# Patient Record
Sex: FEMALE
[De-identification: names, ages, dates, MRNs, and addresses within clinical notes are randomized per-mention and may not be internally consistent; named-entity substitution may affect disease eponyms.]

---

## 2019-06-09 ENCOUNTER — HOSPITAL ENCOUNTER (INPATIENT)
Dept: HOSPITAL 12 - REHAB | Age: 74
LOS: 10 days | Discharge: HOME HEALTH | DRG: 58 | End: 2019-06-19
Attending: PHYSICAL MEDICINE & REHABILITATION | Admitting: PHYSICAL MEDICINE & REHABILITATION
Payer: MEDICARE

## 2019-06-09 VITALS — DIASTOLIC BLOOD PRESSURE: 62 MMHG | SYSTOLIC BLOOD PRESSURE: 125 MMHG

## 2019-06-09 VITALS — SYSTOLIC BLOOD PRESSURE: 132 MMHG | DIASTOLIC BLOOD PRESSURE: 67 MMHG

## 2019-06-09 DIAGNOSIS — Z91.81: ICD-10-CM

## 2019-06-09 DIAGNOSIS — J44.9: ICD-10-CM

## 2019-06-09 DIAGNOSIS — Z66: ICD-10-CM

## 2019-06-09 DIAGNOSIS — M81.0: ICD-10-CM

## 2019-06-09 DIAGNOSIS — E03.9: ICD-10-CM

## 2019-06-09 DIAGNOSIS — I50.32: ICD-10-CM

## 2019-06-09 DIAGNOSIS — D63.8: ICD-10-CM

## 2019-06-09 DIAGNOSIS — G62.9: ICD-10-CM

## 2019-06-09 DIAGNOSIS — H54.62: ICD-10-CM

## 2019-06-09 DIAGNOSIS — I70.0: ICD-10-CM

## 2019-06-09 DIAGNOSIS — R26.9: ICD-10-CM

## 2019-06-09 DIAGNOSIS — Z99.3: ICD-10-CM

## 2019-06-09 DIAGNOSIS — Z96.642: ICD-10-CM

## 2019-06-09 DIAGNOSIS — H46.12: ICD-10-CM

## 2019-06-09 DIAGNOSIS — Z87.891: ICD-10-CM

## 2019-06-09 DIAGNOSIS — R53.81: ICD-10-CM

## 2019-06-09 DIAGNOSIS — G89.29: ICD-10-CM

## 2019-06-09 DIAGNOSIS — G35: Primary | ICD-10-CM

## 2019-06-09 DIAGNOSIS — E78.5: ICD-10-CM

## 2019-06-09 DIAGNOSIS — I42.9: ICD-10-CM

## 2019-06-09 DIAGNOSIS — F33.2: ICD-10-CM

## 2019-06-09 DIAGNOSIS — M19.90: ICD-10-CM

## 2019-06-09 DIAGNOSIS — G93.41: ICD-10-CM

## 2019-06-09 DIAGNOSIS — I11.0: ICD-10-CM

## 2019-06-09 DIAGNOSIS — I50.9: ICD-10-CM

## 2019-06-09 RX ADMIN — TEMAZEPAM PRN MG: 15 CAPSULE ORAL at 23:21

## 2019-06-09 RX ADMIN — MORPHINE SULFATE SCH MG: 15 TABLET ORAL at 21:04

## 2019-06-09 RX ADMIN — Medication PRN MG: at 22:35

## 2019-06-09 NOTE — NUR
Received pt in bed, AAO x 3, watching television. No acute distress noted. Verbally 
responsive and able to make needs known. Denies pain or discomfort at this time. All due 
medications given as ordered, tolerated. All safety measures and fall precautions 
maintained. Call light and all personal belongings within reach. Will continue to monitor.

## 2019-06-09 NOTE — NUR
125/62, 98% on room air, 72 heart rate, 98.4 oral temperature, 19 respirations, no 
complaints of pain, no signs distress, MD morales notified of patients arrival, patient 
noted transferring to commode

## 2019-06-09 NOTE — NUR
RECEIVED ON UNIT WITH FAMILY AND FRIENDS AT HER SIDE. BROUGHT IN VIA HER OWN W/C FROM HOME. 
IN NO ACUTE DISTRESS. ORIENTED TO ROOM AND SURROUNDING VERBALIZE UNDERSTANDING.

## 2019-06-10 VITALS — DIASTOLIC BLOOD PRESSURE: 80 MMHG | SYSTOLIC BLOOD PRESSURE: 135 MMHG

## 2019-06-10 VITALS — SYSTOLIC BLOOD PRESSURE: 115 MMHG | DIASTOLIC BLOOD PRESSURE: 60 MMHG

## 2019-06-10 VITALS — DIASTOLIC BLOOD PRESSURE: 83 MMHG | SYSTOLIC BLOOD PRESSURE: 155 MMHG

## 2019-06-10 RX ADMIN — GABAPENTIN SCH MG: 400 CAPSULE ORAL at 08:26

## 2019-06-10 RX ADMIN — ATORVASTATIN CALCIUM SCH MG: 40 TABLET, FILM COATED ORAL at 20:25

## 2019-06-10 RX ADMIN — Medication SCH MG: at 08:22

## 2019-06-10 RX ADMIN — PANTOPRAZOLE SODIUM SCH MG: 40 TABLET, DELAYED RELEASE ORAL at 06:37

## 2019-06-10 RX ADMIN — Medication SCH MG: at 13:26

## 2019-06-10 RX ADMIN — FUROSEMIDE SCH MG: 40 TABLET ORAL at 17:24

## 2019-06-10 RX ADMIN — GABAPENTIN SCH MG: 400 CAPSULE ORAL at 17:23

## 2019-06-10 RX ADMIN — LEVOTHYROXINE SODIUM SCH MCG: 75 TABLET ORAL at 06:37

## 2019-06-10 RX ADMIN — FUROSEMIDE SCH MG: 40 TABLET ORAL at 08:21

## 2019-06-10 RX ADMIN — Medication PRN MG: at 15:30

## 2019-06-10 RX ADMIN — ALENDRONATE SODIUM SCH MG: 70 TABLET ORAL at 08:26

## 2019-06-10 RX ADMIN — PREDNISOLONE ACETATE SCH DROP: 10 SUSPENSION/ DROPS OPHTHALMIC at 17:26

## 2019-06-10 RX ADMIN — METOPROLOL SUCCINATE SCH MG: 50 TABLET, FILM COATED, EXTENDED RELEASE ORAL at 08:22

## 2019-06-10 RX ADMIN — Medication SCH MG: at 17:23

## 2019-06-10 RX ADMIN — MORPHINE SULFATE SCH MG: 15 TABLET ORAL at 20:26

## 2019-06-10 RX ADMIN — ATORVASTATIN CALCIUM SCH MG: 40 TABLET, FILM COATED ORAL at 20:29

## 2019-06-10 RX ADMIN — Medication SCH MEQ: at 08:21

## 2019-06-10 RX ADMIN — Medication PRN MG: at 08:21

## 2019-06-10 RX ADMIN — Medication PRN MG: at 22:51

## 2019-06-10 RX ADMIN — VITAMIN D, TAB 1000IU (100/BT) SCH UNIT: 25 TAB at 08:20

## 2019-06-10 RX ADMIN — GABAPENTIN SCH MG: 400 CAPSULE ORAL at 13:26

## 2019-06-10 RX ADMIN — MORPHINE SULFATE SCH MG: 15 TABLET ORAL at 08:21

## 2019-06-10 NOTE — NUR
Patient alert and oriented x 3-4. Received in bed watching TV. C/O pain upon assessment, but 
does not watch medication at this time. Psych consult ordered, but not seen yet. Patient 
assisted to wheelchair so she can explore the unit. No SOB or distress notes. Will continue 
to monitor.

## 2019-06-10 NOTE — NUR
Patient made statements to   about "wishing she would die soon", denies suicide 
plan. MD Lemons notified with orders for Psyche evaluation. Order placed at this time

## 2019-06-10 NOTE — NUR
Patient noted eating breakfast at this time, took all medications this morning, prn pain 
medication given for left sided generalized pain, no signs of distress noted, call light 
placed in reach, all needs met at this time

## 2019-06-11 VITALS — DIASTOLIC BLOOD PRESSURE: 86 MMHG | SYSTOLIC BLOOD PRESSURE: 172 MMHG

## 2019-06-11 VITALS — SYSTOLIC BLOOD PRESSURE: 166 MMHG | DIASTOLIC BLOOD PRESSURE: 75 MMHG

## 2019-06-11 VITALS — DIASTOLIC BLOOD PRESSURE: 79 MMHG | SYSTOLIC BLOOD PRESSURE: 157 MMHG

## 2019-06-11 VITALS — DIASTOLIC BLOOD PRESSURE: 75 MMHG | SYSTOLIC BLOOD PRESSURE: 146 MMHG

## 2019-06-11 RX ADMIN — GABAPENTIN SCH MG: 400 CAPSULE ORAL at 13:24

## 2019-06-11 RX ADMIN — GABAPENTIN SCH MG: 400 CAPSULE ORAL at 08:35

## 2019-06-11 RX ADMIN — FUROSEMIDE SCH MG: 40 TABLET ORAL at 08:36

## 2019-06-11 RX ADMIN — PANTOPRAZOLE SODIUM SCH MG: 40 TABLET, DELAYED RELEASE ORAL at 06:33

## 2019-06-11 RX ADMIN — Medication SCH MG: at 08:34

## 2019-06-11 RX ADMIN — Medication SCH MG: at 16:56

## 2019-06-11 RX ADMIN — Medication PRN MG: at 15:34

## 2019-06-11 RX ADMIN — MORPHINE SULFATE SCH MG: 15 TABLET ORAL at 20:52

## 2019-06-11 RX ADMIN — Medication SCH MG: at 08:35

## 2019-06-11 RX ADMIN — VITAMIN D, TAB 1000IU (100/BT) SCH UNIT: 25 TAB at 08:35

## 2019-06-11 RX ADMIN — GABAPENTIN SCH MG: 400 CAPSULE ORAL at 16:56

## 2019-06-11 RX ADMIN — PREDNISOLONE ACETATE SCH DROP: 10 SUSPENSION/ DROPS OPHTHALMIC at 08:36

## 2019-06-11 RX ADMIN — PREDNISOLONE ACETATE SCH DROP: 10 SUSPENSION/ DROPS OPHTHALMIC at 16:56

## 2019-06-11 RX ADMIN — Medication SCH MEQ: at 08:36

## 2019-06-11 RX ADMIN — FUROSEMIDE SCH MG: 40 TABLET ORAL at 16:56

## 2019-06-11 RX ADMIN — LEVOTHYROXINE SODIUM SCH MCG: 75 TABLET ORAL at 06:33

## 2019-06-11 RX ADMIN — TEMAZEPAM PRN MG: 15 CAPSULE ORAL at 01:25

## 2019-06-11 RX ADMIN — METOPROLOL SUCCINATE SCH MG: 50 TABLET, FILM COATED, EXTENDED RELEASE ORAL at 08:36

## 2019-06-11 RX ADMIN — MORPHINE SULFATE SCH MG: 15 TABLET ORAL at 08:35

## 2019-06-11 NOTE — NUR
Received patient awake in bed in stable condition. For psychiatrist consult regarding 
verbalization, she wants to die the sooner the better. MD Olivera notified thru voice 
message. will continue monitor

## 2019-06-11 NOTE — NUR
Patient slept poor this night shift. PRN Restoril given, without much effective. PRN pain 
medication given x1. All due medication given-tolerated well. Hydralazine PRN given-will 
monitor for BP decrease. given snacks as requested during the night. Patient would 
frequently wheel herself up to the nurses station and talk with nurses for distraction from 
inability to sleep. Side rails up bilaterally for safety. Call light and frequently used 
items within reach. Will endorse to oncoming shift accordingly.

## 2019-06-11 NOTE — NUR
Morning BP noted to be 172/86. On call MD Tello contacted for orders. MD order 
Hydralazine 25mg once, with day shift MD to follow up to ajsust medications. Will continue 
to monitor.

## 2019-06-12 VITALS — SYSTOLIC BLOOD PRESSURE: 99 MMHG | DIASTOLIC BLOOD PRESSURE: 54 MMHG

## 2019-06-12 VITALS — DIASTOLIC BLOOD PRESSURE: 68 MMHG | SYSTOLIC BLOOD PRESSURE: 144 MMHG

## 2019-06-12 VITALS — DIASTOLIC BLOOD PRESSURE: 80 MMHG | SYSTOLIC BLOOD PRESSURE: 159 MMHG

## 2019-06-12 VITALS — SYSTOLIC BLOOD PRESSURE: 148 MMHG | DIASTOLIC BLOOD PRESSURE: 72 MMHG

## 2019-06-12 LAB
ALP SERPL-CCNC: 99 U/L (ref 50–136)
ALT SERPL W/O P-5'-P-CCNC: 19 U/L (ref 14–59)
AST SERPL-CCNC: 27 U/L (ref 15–37)
BASOPHILS # BLD AUTO: 0 K/UL (ref 0–8)
BASOPHILS NFR BLD AUTO: 0.5 % (ref 0–2)
BILIRUB SERPL-MCNC: 0.2 MG/DL (ref 0.2–1)
BUN SERPL-MCNC: 19 MG/DL (ref 7–18)
CHLORIDE SERPL-SCNC: 104 MMOL/L (ref 98–107)
CHOLEST SERPL-MCNC: 141 MG/DL (ref ?–200)
CO2 SERPL-SCNC: 29 MMOL/L (ref 21–32)
CREAT SERPL-MCNC: 0.9 MG/DL (ref 0.6–1.3)
EOSINOPHIL # BLD AUTO: 0.1 K/UL (ref 0–0.7)
EOSINOPHIL NFR BLD AUTO: 2 % (ref 0–7)
GLUCOSE SERPL-MCNC: 89 MG/DL (ref 74–106)
HCT VFR BLD AUTO: 31.7 % (ref 31.2–41.9)
HDLC SERPL-MCNC: 38 MG/DL (ref 40–60)
HGB BLD-MCNC: 10.3 G/DL (ref 10.9–14.3)
IRON SERPL-MCNC: 25 UG/DL (ref 50–175)
LYMPHOCYTES # BLD AUTO: 0.4 K/UL (ref 20–40)
LYMPHOCYTES NFR BLD AUTO: 9 % (ref 20.5–51.5)
MAGNESIUM SERPL-MCNC: 1.9 MG/DL (ref 1.8–2.4)
MCH RBC QN AUTO: 27.4 UUG (ref 24.7–32.8)
MCHC RBC AUTO-ENTMCNC: 32 G/DL (ref 32.3–35.6)
MCV RBC AUTO: 84.4 FL (ref 75.5–95.3)
MONOCYTES # BLD AUTO: 0.5 K/UL (ref 2–10)
MONOCYTES NFR BLD AUTO: 11 % (ref 0–11)
NEUTROPHILS # BLD AUTO: 3.8 K/UL (ref 1.8–8.9)
NEUTROPHILS NFR BLD AUTO: 77.5 % (ref 38.5–71.5)
PHOSPHATE SERPL-MCNC: 3.4 MG/DL (ref 2.5–4.9)
PLATELET # BLD AUTO: 382 K/UL (ref 179–408)
POTASSIUM SERPL-SCNC: 4 MMOL/L (ref 3.5–5.1)
RBC # BLD AUTO: 3.75 MIL/UL (ref 3.63–4.92)
TRIGL SERPL-MCNC: 205 MG/DL (ref 30–150)
TSH SERPL DL<=0.005 MIU/L-ACNC: 2.26 MIU/ML (ref 0.36–3.74)
WBC # BLD AUTO: 4.9 K/UL (ref 3.8–11.8)
WS STN SPEC: 6.8 G/DL (ref 6.4–8.2)

## 2019-06-12 RX ADMIN — Medication SCH MEQ: at 09:49

## 2019-06-12 RX ADMIN — FUROSEMIDE SCH MG: 40 TABLET ORAL at 09:49

## 2019-06-12 RX ADMIN — LEVOTHYROXINE SODIUM SCH MCG: 75 TABLET ORAL at 06:31

## 2019-06-12 RX ADMIN — FERROUS SULFATE TAB 325 MG (65 MG ELEMENTAL FE) SCH MG: 325 (65 FE) TAB at 10:02

## 2019-06-12 RX ADMIN — GABAPENTIN SCH MG: 400 CAPSULE ORAL at 13:18

## 2019-06-12 RX ADMIN — PANTOPRAZOLE SODIUM SCH MG: 40 TABLET, DELAYED RELEASE ORAL at 06:29

## 2019-06-12 RX ADMIN — FUROSEMIDE SCH MG: 40 TABLET ORAL at 17:00

## 2019-06-12 RX ADMIN — PREDNISOLONE ACETATE SCH DROP: 10 SUSPENSION/ DROPS OPHTHALMIC at 09:54

## 2019-06-12 RX ADMIN — METOPROLOL SUCCINATE SCH MG: 50 TABLET, FILM COATED, EXTENDED RELEASE ORAL at 09:53

## 2019-06-12 RX ADMIN — GABAPENTIN SCH MG: 400 CAPSULE ORAL at 17:15

## 2019-06-12 RX ADMIN — Medication SCH MG: at 09:51

## 2019-06-12 RX ADMIN — Medication SCH MG: at 09:50

## 2019-06-12 RX ADMIN — ATORVASTATIN CALCIUM SCH MG: 40 TABLET, FILM COATED ORAL at 20:22

## 2019-06-12 RX ADMIN — VITAMIN D, TAB 1000IU (100/BT) SCH UNIT: 25 TAB at 09:50

## 2019-06-12 RX ADMIN — MORPHINE SULFATE SCH MG: 15 TABLET ORAL at 20:22

## 2019-06-12 RX ADMIN — PREDNISOLONE ACETATE SCH DROP: 10 SUSPENSION/ DROPS OPHTHALMIC at 17:14

## 2019-06-12 RX ADMIN — Medication PRN MG: at 00:37

## 2019-06-12 RX ADMIN — GABAPENTIN SCH MG: 400 CAPSULE ORAL at 09:50

## 2019-06-12 RX ADMIN — MORPHINE SULFATE SCH MG: 15 TABLET ORAL at 09:53

## 2019-06-12 RX ADMIN — AMLODIPINE BESYLATE SCH MG: 5 TABLET ORAL at 10:02

## 2019-06-12 RX ADMIN — Medication SCH MG: at 17:14

## 2019-06-12 RX ADMIN — TEMAZEPAM PRN MG: 15 CAPSULE ORAL at 01:16

## 2019-06-12 NOTE — NUR
Patient awake, alert, in bed, not in any form of distress. She denies any pain or discomfort 
at this time. Assisted her to the toilet via wheelchair. Call light and frequently used 
items placed within reach.

## 2019-06-12 NOTE — NUR
aaox4 OOB in wheelchair most of the shift needs attended. complained of pain 

in the neck radiating to lower back. MS contin(scheduled) given. Tolerated well.

Voiding well. Requested for sleeping pill given as needed plus the Oxy 15 mg given

for pain. VSS. Will monitor patient. Patient due for psych consult, awaiting for

the psych doctor to see her.

## 2019-06-12 NOTE — NUR
Patient received sitting in the wheelchair, AAO x4. Able to make needs known. No acute 
distress or SOB noted. On room air. Complained of severe pain on her neck, rated 9/10. 
Physical assessment done. Safety measures observed. Fall precaution maintained. Bed in low 
position, side rails up x2 for safety, brake and alarm on. call light and personal 
belongings within reach. Continue to monitor.

## 2019-06-13 VITALS — DIASTOLIC BLOOD PRESSURE: 85 MMHG | SYSTOLIC BLOOD PRESSURE: 165 MMHG

## 2019-06-13 VITALS — SYSTOLIC BLOOD PRESSURE: 117 MMHG | DIASTOLIC BLOOD PRESSURE: 60 MMHG

## 2019-06-13 VITALS — SYSTOLIC BLOOD PRESSURE: 147 MMHG | DIASTOLIC BLOOD PRESSURE: 79 MMHG

## 2019-06-13 VITALS — SYSTOLIC BLOOD PRESSURE: 129 MMHG | DIASTOLIC BLOOD PRESSURE: 78 MMHG

## 2019-06-13 RX ADMIN — AMLODIPINE BESYLATE SCH MG: 5 TABLET ORAL at 08:36

## 2019-06-13 RX ADMIN — ATORVASTATIN CALCIUM SCH MG: 40 TABLET, FILM COATED ORAL at 20:02

## 2019-06-13 RX ADMIN — LEVOTHYROXINE SODIUM SCH MCG: 75 TABLET ORAL at 06:42

## 2019-06-13 RX ADMIN — Medication SCH MEQ: at 08:36

## 2019-06-13 RX ADMIN — FUROSEMIDE SCH MG: 40 TABLET ORAL at 17:00

## 2019-06-13 RX ADMIN — MORPHINE SULFATE SCH MG: 15 TABLET ORAL at 20:02

## 2019-06-13 RX ADMIN — PREDNISOLONE ACETATE SCH DROP: 10 SUSPENSION/ DROPS OPHTHALMIC at 17:25

## 2019-06-13 RX ADMIN — TEMAZEPAM PRN MG: 15 CAPSULE ORAL at 21:58

## 2019-06-13 RX ADMIN — TEMAZEPAM PRN MG: 15 CAPSULE ORAL at 01:40

## 2019-06-13 RX ADMIN — MORPHINE SULFATE SCH MG: 15 TABLET ORAL at 08:36

## 2019-06-13 RX ADMIN — Medication SCH MG: at 17:24

## 2019-06-13 RX ADMIN — PREDNISOLONE ACETATE SCH DROP: 10 SUSPENSION/ DROPS OPHTHALMIC at 08:37

## 2019-06-13 RX ADMIN — FUROSEMIDE SCH MG: 40 TABLET ORAL at 08:36

## 2019-06-13 RX ADMIN — PANTOPRAZOLE SODIUM SCH MG: 40 TABLET, DELAYED RELEASE ORAL at 06:42

## 2019-06-13 RX ADMIN — Medication PRN MG: at 10:57

## 2019-06-13 RX ADMIN — Medication SCH MG: at 08:34

## 2019-06-13 RX ADMIN — Medication SCH EA: at 17:25

## 2019-06-13 RX ADMIN — VITAMIN D, TAB 1000IU (100/BT) SCH UNIT: 25 TAB at 08:36

## 2019-06-13 RX ADMIN — Medication PRN MG: at 00:12

## 2019-06-13 RX ADMIN — GABAPENTIN SCH MG: 400 CAPSULE ORAL at 08:34

## 2019-06-13 RX ADMIN — GABAPENTIN SCH MG: 400 CAPSULE ORAL at 12:57

## 2019-06-13 RX ADMIN — FERROUS SULFATE TAB 325 MG (65 MG ELEMENTAL FE) SCH MG: 325 (65 FE) TAB at 08:36

## 2019-06-13 RX ADMIN — METOPROLOL SUCCINATE SCH MG: 50 TABLET, FILM COATED, EXTENDED RELEASE ORAL at 08:36

## 2019-06-13 RX ADMIN — Medication SCH MG: at 08:37

## 2019-06-13 RX ADMIN — GABAPENTIN SCH MG: 400 CAPSULE ORAL at 17:24

## 2019-06-13 NOTE — NUR
Received patient awake in bed in stable condition. Continue therapy for ambulation, ADL, and 
unsteady gait. no behavioral issue noted. Continue pain management if needed. will continue 
monitor

## 2019-06-13 NOTE — NUR
Patient received in bed, AAO x4. Able to make needs known. No acute distress or SOB noted. 
On room air. Complained of severe pain on her neck, rated 8/10. Physical assessment done. 
Safety measures observed. Fall precaution maintained. Bed in low position, side rails up x2 
for safety, brake and alarm on. call light and personal belongings within reach. Continue to 
monitor.

## 2019-06-13 NOTE — NUR
As per therapy, Patient verbalize that she dont want to live in her brothers place and 
nursing home. PT said, will see the patient tomorrow, Patient verbalize"If Im still here". 
MD Koroma (Psych on call) notified and agree to consult tomorrow. Patient consumed % 
of her meal.  will continue monitor

## 2019-06-13 NOTE — NUR
Patient developed a high BP:165/85, HR:81. No PRN medication for BP. Contacted on-call 
doctor for the order, Waiting for the response. Will monitor the patient and will endorse to 
the day shift nurse accordingly.

## 2019-06-14 VITALS — SYSTOLIC BLOOD PRESSURE: 146 MMHG | DIASTOLIC BLOOD PRESSURE: 62 MMHG

## 2019-06-14 VITALS — DIASTOLIC BLOOD PRESSURE: 75 MMHG | SYSTOLIC BLOOD PRESSURE: 119 MMHG

## 2019-06-14 VITALS — DIASTOLIC BLOOD PRESSURE: 74 MMHG | SYSTOLIC BLOOD PRESSURE: 143 MMHG

## 2019-06-14 LAB — HEMOCCULT STL QL: POSITIVE

## 2019-06-14 RX ADMIN — METOPROLOL SUCCINATE SCH MG: 50 TABLET, FILM COATED, EXTENDED RELEASE ORAL at 08:38

## 2019-06-14 RX ADMIN — Medication SCH EA: at 17:18

## 2019-06-14 RX ADMIN — PREDNISOLONE ACETATE SCH DROP: 10 SUSPENSION/ DROPS OPHTHALMIC at 08:39

## 2019-06-14 RX ADMIN — VITAMIN D, TAB 1000IU (100/BT) SCH UNIT: 25 TAB at 08:37

## 2019-06-14 RX ADMIN — PREDNISOLONE ACETATE SCH DROP: 10 SUSPENSION/ DROPS OPHTHALMIC at 17:17

## 2019-06-14 RX ADMIN — Medication SCH MG: at 08:40

## 2019-06-14 RX ADMIN — Medication SCH MG: at 17:18

## 2019-06-14 RX ADMIN — MORPHINE SULFATE SCH MG: 15 TABLET ORAL at 08:48

## 2019-06-14 RX ADMIN — GABAPENTIN SCH MG: 400 CAPSULE ORAL at 08:38

## 2019-06-14 RX ADMIN — FUROSEMIDE SCH MG: 40 TABLET ORAL at 17:00

## 2019-06-14 RX ADMIN — Medication SCH EA: at 08:39

## 2019-06-14 RX ADMIN — FERROUS SULFATE TAB 325 MG (65 MG ELEMENTAL FE) SCH MG: 325 (65 FE) TAB at 08:38

## 2019-06-14 RX ADMIN — AMLODIPINE BESYLATE SCH MG: 5 TABLET ORAL at 08:37

## 2019-06-14 RX ADMIN — Medication SCH MG: at 08:39

## 2019-06-14 RX ADMIN — Medication SCH MEQ: at 08:37

## 2019-06-14 RX ADMIN — GABAPENTIN SCH MG: 400 CAPSULE ORAL at 17:18

## 2019-06-14 RX ADMIN — Medication PRN MG: at 01:16

## 2019-06-14 RX ADMIN — PANTOPRAZOLE SODIUM SCH MG: 40 TABLET, DELAYED RELEASE ORAL at 06:31

## 2019-06-14 RX ADMIN — GABAPENTIN SCH MG: 400 CAPSULE ORAL at 13:24

## 2019-06-14 RX ADMIN — Medication PRN MG: at 21:49

## 2019-06-14 RX ADMIN — Medication PRN MG: at 11:11

## 2019-06-14 RX ADMIN — FUROSEMIDE SCH MG: 40 TABLET ORAL at 08:37

## 2019-06-14 RX ADMIN — LEVOTHYROXINE SODIUM SCH MCG: 75 TABLET ORAL at 06:30

## 2019-06-14 RX ADMIN — ATORVASTATIN CALCIUM SCH MG: 40 TABLET, FILM COATED ORAL at 20:59

## 2019-06-14 NOTE — NUR
Spoke with MD on call Gabe regarding positive occult stool results. MD to put in orders 
for CBC for tomorrow morning. Will continue to monitor.

## 2019-06-14 NOTE — NUR
Patient received in wheelchair watching TV. Alert and oriented x 3-4. No C/O pain upon 
assessment. Stool collected as ordered. No SOB or distress notes. Call light and frequently 
used items within reach. Will continue to monitor.

## 2019-06-14 NOTE — NUR
Received patient awake, alert, sitting on the wheelchair, not in any form of distress. She 
denies any pain or discomfort at this time. Needs attended to. Call light and frequently 
used items within reach.

## 2019-06-14 NOTE — NUR
End of the shift note

Patient was stable throughout the shift and has a good sleep last night. No acute distress 
or SOB noted. On room air. Complained of pain in her neck rated 8/10 in numeric scale. Pain 
assessed and reassessed after pain medication. Checked Vital Signs. Offloaded heels by 
pillow. Assisted her to the bathroom by stand by supervision as needed. All due medications 
given and tolerated well. Safety measures observed. Fall precaution maintained. Bed in low 
position, side rails up x2 for safety, brake and alarm on. call light and personal 
belongings within reach. Continue to monitor.

## 2019-06-15 VITALS — DIASTOLIC BLOOD PRESSURE: 89 MMHG | SYSTOLIC BLOOD PRESSURE: 155 MMHG

## 2019-06-15 VITALS — SYSTOLIC BLOOD PRESSURE: 113 MMHG | DIASTOLIC BLOOD PRESSURE: 55 MMHG

## 2019-06-15 LAB
BASOPHILS # BLD AUTO: 0 K/UL (ref 0–8)
BASOPHILS NFR BLD AUTO: 0.5 % (ref 0–2)
BASOPHILS NFR BLD MANUAL: 0 % (ref 0–2)
EOSINOPHIL # BLD AUTO: 0.2 K/UL (ref 0–0.7)
EOSINOPHIL NFR BLD AUTO: 4.1 % (ref 0–7)
EOSINOPHIL NFR BLD MANUAL: 0 % (ref 0–8)
HCT VFR BLD AUTO: 30.3 % (ref 31.2–41.9)
HGB BLD-MCNC: 9.9 G/DL (ref 10.9–14.3)
LYMPHOCYTES # BLD AUTO: 0.7 K/UL (ref 20–40)
LYMPHOCYTES NFR BLD AUTO: 15.6 % (ref 20.5–51.5)
LYMPHOCYTES NFR BLD MANUAL: 17 % (ref 20–40)
MCH RBC QN AUTO: 27.6 UUG (ref 24.7–32.8)
MCHC RBC AUTO-ENTMCNC: 33 G/DL (ref 32.3–35.6)
MCV RBC AUTO: 84 FL (ref 75.5–95.3)
MONOCYTES # BLD AUTO: 0.7 K/UL (ref 2–10)
MONOCYTES NFR BLD AUTO: 14.5 % (ref 0–11)
MONOCYTES NFR BLD MANUAL: 5 % (ref 2–10)
NEUTROPHILS # BLD AUTO: 2.9 K/UL (ref 1.8–8.9)
NEUTROPHILS NFR BLD AUTO: 65.3 % (ref 38.5–71.5)
NEUTS SEG NFR BLD MANUAL: 68 % (ref 42–75)
PLATELET # BLD AUTO: 321 K/UL (ref 179–408)
RBC # BLD AUTO: 3.61 MIL/UL (ref 3.63–4.92)
WBC # BLD AUTO: 4.5 K/UL (ref 3.8–11.8)

## 2019-06-15 RX ADMIN — BUPROPION HYDROCHLORIDE SCH MG: 100 TABLET, EXTENDED RELEASE ORAL at 08:46

## 2019-06-15 RX ADMIN — GABAPENTIN SCH MG: 400 CAPSULE ORAL at 18:40

## 2019-06-15 RX ADMIN — Medication SCH MG: at 18:42

## 2019-06-15 RX ADMIN — Medication PRN MG: at 20:00

## 2019-06-15 RX ADMIN — Medication SCH MG: at 08:47

## 2019-06-15 RX ADMIN — TEMAZEPAM PRN MG: 15 CAPSULE ORAL at 01:17

## 2019-06-15 RX ADMIN — Medication SCH MEQ: at 08:46

## 2019-06-15 RX ADMIN — Medication SCH EA: at 18:41

## 2019-06-15 RX ADMIN — GABAPENTIN SCH MG: 400 CAPSULE ORAL at 08:47

## 2019-06-15 RX ADMIN — FUROSEMIDE SCH MG: 40 TABLET ORAL at 08:50

## 2019-06-15 RX ADMIN — FUROSEMIDE SCH MG: 40 TABLET ORAL at 13:58

## 2019-06-15 RX ADMIN — PANTOPRAZOLE SODIUM SCH MG: 40 TABLET, DELAYED RELEASE ORAL at 06:34

## 2019-06-15 RX ADMIN — PREDNISOLONE ACETATE SCH DROP: 10 SUSPENSION/ DROPS OPHTHALMIC at 18:43

## 2019-06-15 RX ADMIN — GABAPENTIN SCH MG: 400 CAPSULE ORAL at 13:09

## 2019-06-15 RX ADMIN — PREDNISOLONE ACETATE SCH DROP: 10 SUSPENSION/ DROPS OPHTHALMIC at 08:48

## 2019-06-15 RX ADMIN — ATORVASTATIN CALCIUM SCH MG: 40 TABLET, FILM COATED ORAL at 20:00

## 2019-06-15 RX ADMIN — Medication SCH EA: at 08:48

## 2019-06-15 RX ADMIN — FERROUS SULFATE TAB 325 MG (65 MG ELEMENTAL FE) SCH MG: 325 (65 FE) TAB at 08:45

## 2019-06-15 RX ADMIN — VITAMIN D, TAB 1000IU (100/BT) SCH UNIT: 25 TAB at 08:44

## 2019-06-15 RX ADMIN — LEVOTHYROXINE SODIUM SCH MCG: 75 TABLET ORAL at 06:34

## 2019-06-15 RX ADMIN — METOPROLOL SUCCINATE SCH MG: 50 TABLET, FILM COATED, EXTENDED RELEASE ORAL at 08:45

## 2019-06-15 RX ADMIN — Medication PRN MG: at 08:44

## 2019-06-15 RX ADMIN — AMLODIPINE BESYLATE SCH MG: 5 TABLET ORAL at 08:45

## 2019-06-15 NOTE — NUR
1700 lasix given early per pt request. she says she wants it earlier so she won't be up 
urinating too late.

## 2019-06-15 NOTE — NUR
Patient received in bed, AAO x4. Able to make needs known. No acute distress or SOB noted. 
On room air. Complained of severe pain on her neck, rated 9/10. Physical assessment done. 
Safety measures observed. Fall precaution maintained. Bed in low position, side rails up x2 
for safety, brake and alarm on. call light and personal belongings within reach. Continue to 
monitor.

## 2019-06-16 VITALS — DIASTOLIC BLOOD PRESSURE: 77 MMHG | SYSTOLIC BLOOD PRESSURE: 154 MMHG

## 2019-06-16 VITALS — SYSTOLIC BLOOD PRESSURE: 98 MMHG | DIASTOLIC BLOOD PRESSURE: 60 MMHG

## 2019-06-16 RX ADMIN — AMLODIPINE BESYLATE SCH MG: 5 TABLET ORAL at 08:41

## 2019-06-16 RX ADMIN — VITAMIN D, TAB 1000IU (100/BT) SCH UNIT: 25 TAB at 08:41

## 2019-06-16 RX ADMIN — BUPROPION HYDROCHLORIDE SCH MG: 100 TABLET, EXTENDED RELEASE ORAL at 08:44

## 2019-06-16 RX ADMIN — Medication SCH MEQ: at 08:42

## 2019-06-16 RX ADMIN — GABAPENTIN SCH MG: 400 CAPSULE ORAL at 08:43

## 2019-06-16 RX ADMIN — FUROSEMIDE SCH MG: 40 TABLET ORAL at 08:42

## 2019-06-16 RX ADMIN — TEMAZEPAM PRN MG: 15 CAPSULE ORAL at 00:28

## 2019-06-16 RX ADMIN — Medication SCH EA: at 17:26

## 2019-06-16 RX ADMIN — PREDNISOLONE ACETATE SCH DROP: 10 SUSPENSION/ DROPS OPHTHALMIC at 08:43

## 2019-06-16 RX ADMIN — Medication PRN MG: at 14:19

## 2019-06-16 RX ADMIN — NICOTINE SCH MG: 21 PATCH, EXTENDED RELEASE TOPICAL at 08:42

## 2019-06-16 RX ADMIN — Medication SCH MG: at 08:43

## 2019-06-16 RX ADMIN — PREDNISOLONE ACETATE SCH DROP: 10 SUSPENSION/ DROPS OPHTHALMIC at 17:25

## 2019-06-16 RX ADMIN — Medication SCH MG: at 17:25

## 2019-06-16 RX ADMIN — PANTOPRAZOLE SODIUM SCH MG: 40 TABLET, DELAYED RELEASE ORAL at 06:43

## 2019-06-16 RX ADMIN — Medication PRN MG: at 06:52

## 2019-06-16 RX ADMIN — LEVOTHYROXINE SODIUM SCH MCG: 75 TABLET ORAL at 06:43

## 2019-06-16 RX ADMIN — MORPHINE SULFATE PRN MG: 15 TABLET ORAL at 01:18

## 2019-06-16 RX ADMIN — GABAPENTIN SCH MG: 400 CAPSULE ORAL at 14:09

## 2019-06-16 RX ADMIN — Medication PRN MG: at 22:17

## 2019-06-16 RX ADMIN — Medication SCH EA: at 08:43

## 2019-06-16 RX ADMIN — ATORVASTATIN CALCIUM SCH MG: 40 TABLET, FILM COATED ORAL at 20:20

## 2019-06-16 RX ADMIN — GABAPENTIN SCH MG: 400 CAPSULE ORAL at 17:25

## 2019-06-16 RX ADMIN — FUROSEMIDE SCH MG: 40 TABLET ORAL at 14:10

## 2019-06-16 RX ADMIN — FERROUS SULFATE TAB 325 MG (65 MG ELEMENTAL FE) SCH MG: 325 (65 FE) TAB at 08:42

## 2019-06-16 RX ADMIN — METOPROLOL SUCCINATE SCH MG: 50 TABLET, FILM COATED, EXTENDED RELEASE ORAL at 08:42

## 2019-06-16 NOTE — NUR
Patient received in bed, AAO x4. Able to make needs known. No acute distress or SOB noted. 
On room air. No Complain of pain at this time. Physical assessment done. Safety measures 
observed. Fall precaution maintained. Bed in low position, side rails up x2 for safety, 
brake and alarm on. call light and personal belongings within reach. Continue to monitor.

## 2019-06-16 NOTE — NUR
WELLBUTRIN D/C BY MD D/T HER C/O DIZZINESS AND SHE THINKS IT IS BECAUSE OF THE WELLBUTRIN. 
IN BED RESTING NO C/O DISTRESS

## 2019-06-16 NOTE — NUR
End of the shift note

Patient was stable throughout the shift and has a 6 hours sleep last night with the help of 
Restoril 15 mg cap. No acute distress or SOB noted. On room air. Complained of pain in her 
neck rated 9/10 in numeric scale. Pain assessed and reassessed after pain medication. 
Checked Vital Signs. Offloaded heels by pillow. Assisted her to the bathroom by stand by 
supervision as needed. All due medications given and tolerated well. Safety measures 
observed. Fall precaution maintained. Bed in low position, side rails up x2 for safety, 
brake and alarm on. call light and personal belongings within reach. Continue to monitor.

## 2019-06-17 VITALS — DIASTOLIC BLOOD PRESSURE: 62 MMHG | SYSTOLIC BLOOD PRESSURE: 103 MMHG

## 2019-06-17 VITALS — DIASTOLIC BLOOD PRESSURE: 71 MMHG | SYSTOLIC BLOOD PRESSURE: 150 MMHG

## 2019-06-17 VITALS — SYSTOLIC BLOOD PRESSURE: 156 MMHG | DIASTOLIC BLOOD PRESSURE: 80 MMHG

## 2019-06-17 VITALS — DIASTOLIC BLOOD PRESSURE: 69 MMHG | SYSTOLIC BLOOD PRESSURE: 149 MMHG

## 2019-06-17 RX ADMIN — MORPHINE SULFATE PRN MG: 15 TABLET ORAL at 10:11

## 2019-06-17 RX ADMIN — Medication SCH EA: at 08:21

## 2019-06-17 RX ADMIN — PANTOPRAZOLE SODIUM SCH MG: 40 TABLET, DELAYED RELEASE ORAL at 06:33

## 2019-06-17 RX ADMIN — Medication PRN MG: at 12:07

## 2019-06-17 RX ADMIN — ALENDRONATE SODIUM SCH MG: 70 TABLET ORAL at 08:22

## 2019-06-17 RX ADMIN — Medication SCH MG: at 08:24

## 2019-06-17 RX ADMIN — VITAMIN D, TAB 1000IU (100/BT) SCH UNIT: 25 TAB at 08:21

## 2019-06-17 RX ADMIN — Medication SCH MEQ: at 08:20

## 2019-06-17 RX ADMIN — TEMAZEPAM PRN MG: 15 CAPSULE ORAL at 01:07

## 2019-06-17 RX ADMIN — AMLODIPINE BESYLATE SCH MG: 5 TABLET ORAL at 08:20

## 2019-06-17 RX ADMIN — Medication SCH MG: at 16:40

## 2019-06-17 RX ADMIN — GABAPENTIN SCH MG: 400 CAPSULE ORAL at 08:23

## 2019-06-17 RX ADMIN — NICOTINE SCH MG: 21 PATCH, EXTENDED RELEASE TOPICAL at 08:22

## 2019-06-17 RX ADMIN — FUROSEMIDE SCH MG: 40 TABLET ORAL at 08:20

## 2019-06-17 RX ADMIN — FERROUS SULFATE TAB 325 MG (65 MG ELEMENTAL FE) SCH MG: 325 (65 FE) TAB at 08:20

## 2019-06-17 RX ADMIN — PREDNISOLONE ACETATE SCH DROP: 10 SUSPENSION/ DROPS OPHTHALMIC at 16:41

## 2019-06-17 RX ADMIN — GABAPENTIN SCH MG: 400 CAPSULE ORAL at 13:12

## 2019-06-17 RX ADMIN — METOPROLOL SUCCINATE SCH MG: 50 TABLET, FILM COATED, EXTENDED RELEASE ORAL at 08:21

## 2019-06-17 RX ADMIN — ATORVASTATIN CALCIUM SCH MG: 40 TABLET, FILM COATED ORAL at 20:39

## 2019-06-17 RX ADMIN — Medication PRN MG: at 21:38

## 2019-06-17 RX ADMIN — GABAPENTIN SCH MG: 400 CAPSULE ORAL at 16:40

## 2019-06-17 RX ADMIN — LEVOTHYROXINE SODIUM SCH MCG: 75 TABLET ORAL at 06:33

## 2019-06-17 RX ADMIN — PREDNISOLONE ACETATE SCH DROP: 10 SUSPENSION/ DROPS OPHTHALMIC at 08:22

## 2019-06-17 RX ADMIN — Medication SCH EA: at 16:41

## 2019-06-17 RX ADMIN — FUROSEMIDE SCH MG: 40 TABLET ORAL at 13:13

## 2019-06-17 NOTE — NUR
Patient seen and examined by Dr. Lemons and patient complained of palpitations. /61, 
P 74, RR 18, O2 sat 96%. Dr. Lemons ordered EKG.

## 2019-06-17 NOTE — NUR
End of the shift note

Patient was stable throughout the shift and has 6.5 hours sleep last night with the help of 
Restoril 15 mg cap. No acute distress or SOB noted. On room air. Complained of pain in her 
neck rated 9/10 in numeric scale. Pain assessed and reassessed after pain medication. 
Checked Vital Signs. Offloaded heels by pillow. Assisted her to the bathroom by stand by 
supervision as needed. All due medications given and tolerated well. Safety measures 
observed. Fall precaution maintained. Bed in low position, side rails up x2 for safety, 
brake and alarm on. call light and personal belongings within reach. Continue to monitor and 
will endorse to oncoming nurse accordingly.

## 2019-06-17 NOTE — NUR
Received pt in bed, AAO x 3, resting comfortably. No acute distress noted. Verbally 
responsive and able to make needs known. Denies pain or discomfort at this time. All safety 
measures and fall precautions maintained. Call light and all personal belongings within 
reach. Will continue to monitor.

## 2019-06-17 NOTE — NUR
Patient awake, alert, not in any form of distress. Due medications administered and 
tolerated well. She denies any pain or discomfort at this time. Call light placed within 
reach.

## 2019-06-18 VITALS — DIASTOLIC BLOOD PRESSURE: 75 MMHG | SYSTOLIC BLOOD PRESSURE: 139 MMHG

## 2019-06-18 VITALS — DIASTOLIC BLOOD PRESSURE: 72 MMHG | SYSTOLIC BLOOD PRESSURE: 132 MMHG

## 2019-06-18 VITALS — SYSTOLIC BLOOD PRESSURE: 131 MMHG | DIASTOLIC BLOOD PRESSURE: 74 MMHG

## 2019-06-18 VITALS — SYSTOLIC BLOOD PRESSURE: 142 MMHG | DIASTOLIC BLOOD PRESSURE: 73 MMHG

## 2019-06-18 RX ADMIN — Medication SCH EA: at 08:37

## 2019-06-18 RX ADMIN — NICOTINE SCH MG: 21 PATCH, EXTENDED RELEASE TOPICAL at 08:36

## 2019-06-18 RX ADMIN — Medication SCH MG: at 16:34

## 2019-06-18 RX ADMIN — PREDNISOLONE ACETATE SCH DROP: 10 SUSPENSION/ DROPS OPHTHALMIC at 08:38

## 2019-06-18 RX ADMIN — GABAPENTIN SCH MG: 400 CAPSULE ORAL at 12:03

## 2019-06-18 RX ADMIN — Medication SCH MG: at 08:38

## 2019-06-18 RX ADMIN — GABAPENTIN SCH MG: 400 CAPSULE ORAL at 16:34

## 2019-06-18 RX ADMIN — FUROSEMIDE SCH MG: 40 TABLET ORAL at 08:35

## 2019-06-18 RX ADMIN — TEMAZEPAM PRN MG: 15 CAPSULE ORAL at 01:07

## 2019-06-18 RX ADMIN — Medication PRN MG: at 12:04

## 2019-06-18 RX ADMIN — VITAMIN D, TAB 1000IU (100/BT) SCH UNIT: 25 TAB at 08:35

## 2019-06-18 RX ADMIN — FAMOTIDINE SCH MG: 20 TABLET, FILM COATED ORAL at 10:27

## 2019-06-18 RX ADMIN — FERROUS SULFATE TAB 325 MG (65 MG ELEMENTAL FE) SCH MG: 325 (65 FE) TAB at 08:35

## 2019-06-18 RX ADMIN — Medication SCH MEQ: at 08:35

## 2019-06-18 RX ADMIN — ATORVASTATIN CALCIUM SCH MG: 40 TABLET, FILM COATED ORAL at 20:11

## 2019-06-18 RX ADMIN — AMLODIPINE BESYLATE SCH MG: 5 TABLET ORAL at 08:36

## 2019-06-18 RX ADMIN — PREDNISOLONE ACETATE SCH DROP: 10 SUSPENSION/ DROPS OPHTHALMIC at 16:34

## 2019-06-18 RX ADMIN — Medication SCH EA: at 16:35

## 2019-06-18 RX ADMIN — METOPROLOL SUCCINATE SCH MG: 50 TABLET, FILM COATED, EXTENDED RELEASE ORAL at 08:36

## 2019-06-18 RX ADMIN — GABAPENTIN SCH MG: 400 CAPSULE ORAL at 08:37

## 2019-06-18 RX ADMIN — Medication PRN MG: at 06:23

## 2019-06-18 RX ADMIN — LEVOTHYROXINE SODIUM SCH MCG: 75 TABLET ORAL at 06:30

## 2019-06-18 RX ADMIN — Medication PRN MG: at 20:13

## 2019-06-18 RX ADMIN — PANTOPRAZOLE SODIUM SCH MG: 40 TABLET, DELAYED RELEASE ORAL at 06:24

## 2019-06-18 RX ADMIN — FUROSEMIDE SCH MG: 40 TABLET ORAL at 14:00

## 2019-06-18 NOTE — NUR
Patient received bed dozing. patient states that she is very tired from previous night when 
she did not sleep.  Alert and oriented x 3-4. C/O pain upon assessment. Will medicate. SOB 
or distress notes. Side rails up bilaterally for safety. Call light and frequently used 
items within reach. Will continue to monitor.

## 2019-06-18 NOTE — NUR
Patient noted sitting in wheelchair in room, no complaints of pain at this time, no signs of 
distress noted, call light in reach, bedside table in reach with breakfast, all needs met

## 2019-06-19 VITALS — SYSTOLIC BLOOD PRESSURE: 136 MMHG | DIASTOLIC BLOOD PRESSURE: 67 MMHG

## 2019-06-19 VITALS — DIASTOLIC BLOOD PRESSURE: 81 MMHG | SYSTOLIC BLOOD PRESSURE: 158 MMHG

## 2019-06-19 VITALS — SYSTOLIC BLOOD PRESSURE: 158 MMHG | DIASTOLIC BLOOD PRESSURE: 89 MMHG

## 2019-06-19 RX ADMIN — Medication SCH MG: at 08:10

## 2019-06-19 RX ADMIN — PREDNISOLONE ACETATE SCH DROP: 10 SUSPENSION/ DROPS OPHTHALMIC at 17:04

## 2019-06-19 RX ADMIN — Medication SCH EA: at 08:09

## 2019-06-19 RX ADMIN — GABAPENTIN SCH MG: 400 CAPSULE ORAL at 08:09

## 2019-06-19 RX ADMIN — Medication SCH EA: at 17:05

## 2019-06-19 RX ADMIN — PANTOPRAZOLE SODIUM SCH MG: 40 TABLET, DELAYED RELEASE ORAL at 06:36

## 2019-06-19 RX ADMIN — FAMOTIDINE SCH MG: 20 TABLET, FILM COATED ORAL at 08:08

## 2019-06-19 RX ADMIN — NICOTINE SCH MG: 21 PATCH, EXTENDED RELEASE TOPICAL at 08:10

## 2019-06-19 RX ADMIN — FUROSEMIDE SCH MG: 40 TABLET ORAL at 08:09

## 2019-06-19 RX ADMIN — FUROSEMIDE SCH MG: 40 TABLET ORAL at 13:15

## 2019-06-19 RX ADMIN — GABAPENTIN SCH MG: 400 CAPSULE ORAL at 17:04

## 2019-06-19 RX ADMIN — Medication PRN MG: at 08:51

## 2019-06-19 RX ADMIN — FERROUS SULFATE TAB 325 MG (65 MG ELEMENTAL FE) SCH MG: 325 (65 FE) TAB at 08:09

## 2019-06-19 RX ADMIN — GABAPENTIN SCH MG: 400 CAPSULE ORAL at 13:14

## 2019-06-19 RX ADMIN — Medication SCH MEQ: at 08:09

## 2019-06-19 RX ADMIN — PREDNISOLONE ACETATE SCH DROP: 10 SUSPENSION/ DROPS OPHTHALMIC at 08:10

## 2019-06-19 RX ADMIN — AMLODIPINE BESYLATE SCH MG: 5 TABLET ORAL at 08:27

## 2019-06-19 RX ADMIN — METOPROLOL SUCCINATE SCH MG: 50 TABLET, FILM COATED, EXTENDED RELEASE ORAL at 08:26

## 2019-06-19 RX ADMIN — VITAMIN D, TAB 1000IU (100/BT) SCH UNIT: 25 TAB at 08:08

## 2019-06-19 RX ADMIN — Medication SCH MG: at 17:04

## 2019-06-19 RX ADMIN — LEVOTHYROXINE SODIUM SCH MCG: 75 TABLET ORAL at 06:36

## 2019-06-19 RX ADMIN — Medication PRN MG: at 13:20

## 2019-06-19 NOTE — NUR
Discharge instructions provided to the patient with verbalized understanding. Discharge 
papers signed by and given to the patient. All belongings well accounted for. Prescription 
provided to the patient. Patient remains alert, oriented x 4, not in any form of distress. 
No complain of any pain or discomfort at this time. Called [facility] transportation for pick 
up which is available at 7:20PM.

## 2019-06-19 NOTE — NUR
Ambulanz in unit to  pt at this time. Vitals stable, no acute distress noted, denies 
pain or discomfort at this time. All paperwork given. Education provided. Pt verbalized 
understanding.

## 2019-06-19 NOTE — NUR
Patient awake, alert, sitting on the wheelchair, not in any form of distress. Morning due 
medications administered and tolerated well. No complain of any pain or discomfort at this 
time. Assisted with her needs. Call light and frequently used items placed within reach.